# Patient Record
Sex: FEMALE | Race: WHITE | NOT HISPANIC OR LATINO
[De-identification: names, ages, dates, MRNs, and addresses within clinical notes are randomized per-mention and may not be internally consistent; named-entity substitution may affect disease eponyms.]

---

## 2017-07-01 ENCOUNTER — HEALTH MAINTENANCE LETTER (OUTPATIENT)
Age: 27
End: 2017-07-01

## 2018-05-18 ENCOUNTER — TRANSFERRED RECORDS (OUTPATIENT)
Dept: HEALTH INFORMATION MANAGEMENT | Facility: CLINIC | Age: 28
End: 2018-05-18

## 2018-05-22 DIAGNOSIS — Q82.3 INCONTINENTIA PIGMENTI: Primary | ICD-10-CM

## 2018-05-23 ENCOUNTER — OFFICE VISIT (OUTPATIENT)
Dept: OPHTHALMOLOGY | Facility: CLINIC | Age: 28
End: 2018-05-23
Attending: OPHTHALMOLOGY
Payer: COMMERCIAL

## 2018-05-23 DIAGNOSIS — H35.053 RETINAL NEOVASCULARIZATION OF BOTH EYES: ICD-10-CM

## 2018-05-23 DIAGNOSIS — Q82.3 INCONTINENTIA PIGMENTI: Primary | ICD-10-CM

## 2018-05-23 PROCEDURE — G0463 HOSPITAL OUTPT CLINIC VISIT: HCPCS | Mod: ZF

## 2018-05-23 PROCEDURE — 92250 FUNDUS PHOTOGRAPHY W/I&R: CPT | Mod: ZF | Performed by: OPHTHALMOLOGY

## 2018-05-23 PROCEDURE — 92134 CPTRZ OPH DX IMG PST SGM RTA: CPT | Mod: ZF | Performed by: OPHTHALMOLOGY

## 2018-05-23 PROCEDURE — 92235 FLUORESCEIN ANGRPH MLTIFRAME: CPT | Mod: ZF | Performed by: OPHTHALMOLOGY

## 2018-05-23 PROCEDURE — 67228 TREATMENT X10SV RETINOPATHY: CPT | Mod: ZF,LT | Performed by: OPHTHALMOLOGY

## 2018-05-23 RX ORDER — ATROPINE SULFATE 10 MG/ML
1 SOLUTION/ DROPS OPHTHALMIC DAILY
Qty: 1 BOTTLE | Refills: 0 | Status: SHIPPED | OUTPATIENT
Start: 2018-05-23 | End: 2021-07-12

## 2018-05-23 RX ORDER — PREDNISOLONE ACETATE 10 MG/ML
1 SUSPENSION/ DROPS OPHTHALMIC DAILY
Qty: 1 BOTTLE | Refills: 0 | Status: SHIPPED | OUTPATIENT
Start: 2018-05-23 | End: 2019-04-08

## 2018-05-23 ASSESSMENT — CONF VISUAL FIELD
OD_SUPERIOR_TEMPORAL_RESTRICTION: 1
OD_SUPERIOR_NASAL_RESTRICTION: 1
OS_SUPERIOR_TEMPORAL_RESTRICTION: 3
OS_INFERIOR_NASAL_RESTRICTION: 1
OS_INFERIOR_TEMPORAL_RESTRICTION: 1
OS_SUPERIOR_NASAL_RESTRICTION: 3
OD_INFERIOR_TEMPORAL_RESTRICTION: 1
OD_INFERIOR_NASAL_RESTRICTION: 1

## 2018-05-23 ASSESSMENT — VISUAL ACUITY
OS_PH_SC: 20/125
OD_SC: NLP
OS_SC: 20/500
METHOD: SNELLEN - LINEAR

## 2018-05-23 ASSESSMENT — EXTERNAL EXAM - RIGHT EYE: OD_EXAM: NORMAL

## 2018-05-23 ASSESSMENT — TONOMETRY
IOP_METHOD: TONOPEN
OS_IOP_MMHG: 15
OD_IOP_MMHG: 24

## 2018-05-23 ASSESSMENT — SLIT LAMP EXAM - LIDS
COMMENTS: NORMAL
COMMENTS: NORMAL

## 2018-05-23 ASSESSMENT — EXTERNAL EXAM - LEFT EYE: OS_EXAM: NORMAL

## 2018-05-23 NOTE — MR AVS SNAPSHOT
After Visit Summary   5/23/2018    Padmini Blandon    MRN: [id number]           Patient Information     Date Of Birth          1990        Visit Information        Provider Department      5/23/2018 12:00 PM Tiffanie Malloy MD [facility]        Today's Diagnoses     Blind painful eye    -  1    Incontinentia pigmenti           Follow-ups after your visit        Follow-up notes from your care team     Return in about 4 months (around 9/23/2018) for f/u incontinentia pigmenti ou.      Your next 10 appointments already scheduled     Sep 26, 2018  7:15 AM CDT   RETURN RETINA with Tiffanie Malloy MD   Eye Clinic ([facility])    [address]  9[address] Clin [address] [place] [id number] [phone]              Who to contact     Please call your clinic at [phone] to:    Ask questions about your health    Make or cancel appointments    Discuss your medicines    Learn about your test results    Speak to your doctor            Additional Information About Your Visit        [facility] Information     [facility] gives you secure access to your electronic health record. If you see a primary care provider, you can also send messages to your care team and make appointments. If you have questions, please call your primary care clinic.  If you do not have a primary care provider, please call [phone] and they will assist you.      [employer] is an electronic gateway that provides easy, online access to your medical records. With [employer], you can request a clinic appointment, read your test results, renew a prescription or communicate with your care team.     To access your existing account, please contact your [facility] or call [phone] for assistance.        Care EveryWhere ID     This is your Care EveryWhere ID. This could be used by other organizations to access your [place] medical records  [id number]          Blood Pressure from Last 3 Encounters:   No data found for BP    Weight from Last 3 Encounters:   No data found for Wt              We Performed the Following     Fluorescein Angiography OS (left eye)     Fundus Autofluorescence Image (FAF) OU (both eyes)     Fundus Photos OU (both eyes)     OCT Retina Spectralis OU (both eyes)     Panretinal Photocoagulation (PRP) OS (left eye)          Today's Medication Changes          These changes are accurate as of 5/23/18  4:39 PM.  If you have any questions, ask your nurse or doctor.               Start taking these medicines.        Dose/Directions    atropine 1 % ophthalmic solution   Used for:  Blind painful eye   Started by:  Tiffanie Malloy MD        Dose:  1 drop   Place 1 drop into the right eye daily   Quantity:  1 Bottle   Refills:  0       prednisoLONE acetate 1 % ophthalmic susp   Commonly known as:  PRED FORTE   Used for:  Blind painful eye   Started by:  Tiffanie Malloy MD        Dose:  1 drop   Place 1 drop into the right eye daily   Quantity:  1 Bottle   Refills:  0            Where to get your medicines      These medications were sent to [facility]/pharmacy #[id number] [place], [place] [address] [address] [place] [id number]     Phone:  [phone]     atropine 1 % ophthalmic solution    prednisoLONE acetate 1 % ophthalmic susp                Primary Care Provider Fax #    Physician No Ref-Primary [phone]       No address on file        Equal Access to Services     BARRY CHILDERS : Clifford hutchinso Soomaali, waaxda luqadaha, qaybta kaalmada adeegyada, susanne pardo. So [facility] [phone].    ATENCIÓN: Si habla español, tiene a sandoval disposición servicios gratuitos de asistencia lingüística. Llame al [phone].    We comply with applicable federal civil rights laws and [place] laws. We do not discriminate on the basis of race, color, national origin, age, disability, sex, sexual orientation, or  gender identity.            Thank you!     Thank you for choosing EYE CLINIC  for your care. Our goal is always to provide you with excellent care. Hearing back from our patients is one way we can continue to improve our services. Please take a few minutes to complete the written survey that you may receive in the mail after your visit with us. Thank you!             Your Updated Medication List - Protect others around you: Learn how to safely use, store and throw away your medicines at www.disposemymeds.org.          This list is accurate as of 5/23/18  4:39 PM.  Always use your most recent med list.                   Brand Name Dispense Instructions for use Diagnosis    atropine 1 % ophthalmic solution     1 Bottle    Place 1 drop into the right eye daily    Blind painful eye       benzoyl peroxide 5 % Lotn lotion      Apply topically At Bedtime        DOXYCYCLINE CALCIUM PO      Take 1 tablet by mouth daily        prednisoLONE acetate 1 % ophthalmic susp    PRED FORTE    1 Bottle    Place 1 drop into the right eye daily    Blind painful eye       SPIRONOLACTONE PO

## 2018-05-23 NOTE — LETTER
5/23/2018       RE: Padmini Blandon  [address]  [address]  [facility] [address]     Dear Dr. Medina,    Thank you for referring your patient, Padmini Blandon, to the EYE CLINIC at [facility]. Please see a copy of my visit note below.    CC: IP follow up     Padmini Blandon is a  [age] year old year-old with history of incontinenti pigmenti and Tractional retinal detachment right eye longstanding.  Status post Panretinal laser photocoagulation (PRP) filing both eyes here for follow up examination     Patient initially noted worsening of blurry vision OS 1 week ago (5/16/18). Initially felt it was related to allergies, sought care in ED, and was referred to Dr. Medina (retina specialist in [place], [place]) for further evaluation. Patient saw Dr. Medina (5/18/18) and she recommended retinal surgery with SO OS. She recommended that patient seek a second opinion with another retinal specialist prior to undergoing surgery.    Patient notes gradual worsening of VA OD since last eye exam here in 4/2018 (was HM at that time) and currently no light perception   Denies eye pain, occ flashes.    Ocular Imaging:  OCT macuala 5/23/18   Optical Coherence Tomography of poor quality because of  nystagmus shows irregular retina    elevation/gliosis over nerve, cystic changes in nasal macula, fovea attached,  Chronic inferior macula tractional detachment. Compared to previous OCT in 4/2016, appears STABLE.    Fundus Photos 05/23/18   left eye - peripheral laser scars, inferior temporal Tractional retinal detachment appears stable     FAF 05/23/18  demarcating peripheral areas of laser and inferior Tractional retinal detachment left eye     fluorescein angiography 05/23/18   left eye - extensive macular ischemia. Temporal macula laser scars.  leakage inferotemporally, Inferior nasally and mild leakage superotemporally near area of last Panretinal laser photocoagulation (PRP).      Assessment and  plan  Incontinentia pigmenti, both eyes  Patient did not returned for follow up since   right eye - currently with progression to no light perception   Status post Panretinal laser photocoagulation (PRP) 10/16/14 and      left eye - Status post Panretinal laser photocoagulation (PRP) filling left eye                   - Extensive macular ischemia on fluorescein angiography with continued area of leakage inferotemporally, Inferior nasally and mild leakage superotemporally near area of last Panretinal laser photocoagulation (PRP).                     Compared to prior fluorescein angiography there appears to be new leakage Inferior nasal and superior temporal near Panretinal laser photocoagulation (PRP) scars.    I discussed extensively treatment options: additional laser treatment to the new areas of leakage vs close observation vs surgery.  I explained risks, benefits and alternatives of retina surgery includin:1000 risk of infection/bleed/loss of eye; 1:100 risk of RD and need for further surgery. as well as possibility of air/gas/SO  instillation into eye. And cataract formation.    After discussed with patient risks, benefits and alternatives the decision was to performed laser treatment to the new areas of leakage.  Patient complaining of some headache during the procedure and decided to continue laser in 2- 3 months     Chronic tractional retinal detachment, right eye  - VA NLP OD today (verified by MD)  - IOP 24 OD, rubeosis present  -patient reports chronic occasional HA  - discussed risk of neovascular glaucoma and blind painful eye  - start atropine daily and pred forte daily OD  - discussed the possibility of avastin inj right eye to control neovascularization of the iris.patient will consider in the future     - monocular precautions  The patient was told to wear polycarbonte glasses at all times to protect the good eye.  she expressed understanding.      will follow up in 2-3 month with  Optical Coherence Tomography and for additional laser treatment     Retina detachment precautions were discussed with the patient (presence or increased in flashes, floaters or a curtain in the visual field) and was asked to return if any of the those occur    Anitha Page MD   Ophthalmology PGY-3    ~~~~~~~~~~~~~~~~~~~~~~~~~~~~~~~~~~  Complete documentation of historical and exam elements from today's encounter can be found in the full encounter summary report (not reduplicated in this progress note).  I personally obtained the chief complaint(s) and history of present illness.  I confirmed and edited as necessary the review of systems, past medical/surgical history, family history, social history, and examination findings as documented by others; and I examined the patient myself.  I personally reviewed the relevant tests, images, and reports as documented above.  I formulated and edited as necessary the assessment and plan and discussed the findings and management plan with the patient and family and I was present for critical portions of the procedure carried out by the resident/fellow and immediately available for the entire procedure. Tiffanie Malloy MD    Again, thank you for allowing me to participate in the care of your patient.      Sincerely,    Tiffanie Malloy MD     Retina Service   Department of Ophthalmology and Visual Neurosciences   [facility]  Phone:  [phone]   Fax:  [phone]

## 2018-05-23 NOTE — PROGRESS NOTES
CC: IP follow up     Padmini Blandon is a  27 year old year-old with history of incontinenti pigmenti and Tractional retinal detachment right eye longstanding.  Status post Panretinal laser photocoagulation (PRP) filing both eyes here for follow up examination     Patient initially noted worsening of blurry vision OS 1 week ago (5/16/18). Initially felt it was related to allergies, sought care in ED, and was referred to Dr. Medina (retina specialist in [place], [place]) for further evaluation. Patient saw Dr. Medina (5/18/18) and she recommended retinal surgery with SO OS. She recommended that patient seek a second opinion with another retinal specialist prior to undergoing surgery.    Patient notes gradual worsening of VA OD since last eye exam here in 4/2018 (was HM at that time) and currently no light perception   Denies eye pain, occ flashes.    Ocular Imaging:  OCT macuala 5/23/18   Optical Coherence Tomography of poor quality because of  nystagmus shows irregular retina    elevation/gliosis over nerve, cystic changes in nasal macula, fovea attached,  Chronic inferior macula tractional detachment. Compared to previous OCT in 4/2016, appears STABLE.    Fundus Photos 05/23/18   left eye - peripheral laser scars, inferior temporal Tractional retinal detachment appears stable     FAF 05/23/18  demarcating peripheral areas of laser and inferior Tractional retinal detachment left eye     fluorescein angiography 05/23/18   left eye - extensive macular ischemia. Temporal macula laser scars.  leakage inferotemporally, Inferior nasally and mild leakage superotemporally near area of last Panretinal laser photocoagulation (PRP).      Assessment and plan  Incontinentia pigmenti, both eyes  Patient did not returned for follow up since 2015  right eye - currently with progression to no light perception   Status post Panretinal laser photocoagulation (PRP) 10/16/14 and 2015     left eye - Status post Panretinal laser  photocoagulation (PRP) filling left eye                   - Extensive macular ischemia on fluorescein angiography with continued area of leakage inferotemporally, Inferior nasally and mild leakage superotemporally near area of last Panretinal laser photocoagulation (PRP).                     Compared to prior fluorescein angiography there appears to be new leakage Inferior nasal and superior temporal near Panretinal laser photocoagulation (PRP) scars.    I discussed extensively treatment options: additional laser treatment to the new areas of leakage vs close observation vs surgery.  I explained risks, benefits and alternatives of retina surgery includin:1000 risk of infection/bleed/loss of eye; 1:100 risk of RD and need for further surgery. as well as possibility of air/gas/SO  instillation into eye. And cataract formation.    After discussed with patient risks, benefits and alternatives the decision was to performed laser treatment to the new areas of leakage.  Patient complaining of some headache during the procedure and decided to continue laser in 2- 3 months     Chronic tractional retinal detachment, right eye  - VA NLP OD today (verified by MD)  - IOP 24 OD, rubeosis present  -patient reports chronic occasional HA  - discussed risk of neovascular glaucoma and blind painful eye  - start atropine daily and pred forte daily OD  - discussed the possibility of avastin inj right eye to control neovascularization of the iris.patient will consider in the future     - monocular precautions  The patient was told to wear polycarbonte glasses at all times to protect the good eye.  she expressed understanding.      will follow up in 2-3 month with Optical Coherence Tomography and for additional laser treatment     Retina detachment precautions were discussed with the patient (presence or increased in flashes, floaters or a curtain in the visual field) and was asked to return if any of the those occur      Anitha  MD Camilo   Ophthalmology PGY-3    ~~~~~~~~~~~~~~~~~~~~~~~~~~~~~~~~~~   Complete documentation of historical and exam elements from today's encounter can be found in the full encounter summary report (not reduplicated in this progress note).  I personally obtained the chief complaint(s) and history of present illness.  I confirmed and edited as necessary the review of systems, past medical/surgical history, family history, social history, and examination findings as documented by others; and I examined the patient myself.  I personally reviewed the relevant tests, images, and reports as documented above.  I formulated and edited as necessary the assessment and plan and discussed the findings and management plan with the patient and family and I was present for critical portions of the procedure carried out by the resident/fellow and immediately available for the entire procedure    Tiffanie Malloy MD  .  Retina Service   Department of Ophthalmology and Visual Neurosciences   [facility]  Phone: [phone]   Fax: [phone]

## 2018-09-12 DIAGNOSIS — Q82.3 INCONTINENTIA PIGMENTI: Primary | ICD-10-CM

## 2018-09-26 ENCOUNTER — OFFICE VISIT (OUTPATIENT)
Dept: OPHTHALMOLOGY | Facility: CLINIC | Age: 28
End: 2018-09-26
Attending: OPHTHALMOLOGY

## 2018-09-26 DIAGNOSIS — Q82.3 INCONTINENTIA PIGMENTI: Primary | ICD-10-CM

## 2018-09-26 PROCEDURE — 92250 FUNDUS PHOTOGRAPHY W/I&R: CPT | Mod: ZF | Performed by: OPHTHALMOLOGY

## 2018-09-26 PROCEDURE — 92015 DETERMINE REFRACTIVE STATE: CPT | Mod: ZF

## 2018-09-26 PROCEDURE — 92134 CPTRZ OPH DX IMG PST SGM RTA: CPT | Mod: ZF | Performed by: OPHTHALMOLOGY

## 2018-09-26 PROCEDURE — G0463 HOSPITAL OUTPT CLINIC VISIT: HCPCS | Mod: ZF

## 2018-09-26 ASSESSMENT — CONF VISUAL FIELD
OD_INFERIOR_TEMPORAL_RESTRICTION: 1
OD_SUPERIOR_NASAL_RESTRICTION: 1
OS_INFERIOR_TEMPORAL_RESTRICTION: 1
OS_SUPERIOR_TEMPORAL_RESTRICTION: 3
OS_INFERIOR_NASAL_RESTRICTION: 1
OS_SUPERIOR_NASAL_RESTRICTION: 3
OD_INFERIOR_NASAL_RESTRICTION: 1
OD_SUPERIOR_TEMPORAL_RESTRICTION: 1

## 2018-09-26 ASSESSMENT — TONOMETRY
OD_IOP_MMHG: 14
OD_IOP_MMHG: 17
IOP_METHOD: TONOPEN
IOP_METHOD: ICARE
OS_IOP_MMHG: 17
OS_IOP_MMHG: 17

## 2018-09-26 ASSESSMENT — SLIT LAMP EXAM - LIDS
COMMENTS: NORMAL
COMMENTS: NORMAL

## 2018-09-26 ASSESSMENT — VISUAL ACUITY
OD_SC: NLP
OS_SC: 20/300
OS_PH_SC: 20/150
METHOD: SNELLEN - LINEAR

## 2018-09-26 ASSESSMENT — EXTERNAL EXAM - LEFT EYE: OS_EXAM: NORMAL

## 2018-09-26 ASSESSMENT — REFRACTION_MANIFEST
OD_SPHERE: BALANCE
OS_SPHERE: -4.00

## 2018-09-26 ASSESSMENT — EXTERNAL EXAM - RIGHT EYE: OD_EXAM: NORMAL

## 2018-09-26 NOTE — PROGRESS NOTES
CC: IP follow up     Padmini Blandon is a  27 year old year-old with history of incontinenti pigmenti and Tractional retinal detachment right eye longstanding.  Status post Panretinal laser photocoagulation (PRP) filing both eyes here for follow up examination     Patient initially noted worsening of blurry vision OS (5/16/18). Initially felt it was related to allergies, sought care in ED, and was referred to Dr. Medina (retina specialist in [place], [place]) for further evaluation. Patient saw Dr. Medina (5/18/18) and she recommended retinal surgery with SO OS. She recommended that patient seek a second opinion with another retinal specialist prior to undergoing surgery.    Patient noted gradual worsening of VA OD in 4/2018 (was HM at that time) and was no light perception in 5/2018. Reports VA has been stable since last exam. Occasional flashes. No eye pain.    Ocular Imaging:  OCT macuala 09/26/18   Optical Coherence Tomography of poor quality because of  nystagmus shows irregular retina elevation/gliosis over nerve, cystic changes in nasal macula, fovea attached,  Chronic inferior macula tractional detachment. Compared to previous OCT in 5/2018 appears STABLE.    Fundus Photos 09/26/18   Left eye - peripheral laser scars, inferior temporal Tractional retinal detachment appears stable     FAF 05/23/18    Demarcating peripheral areas of laser and inferior Tractional retinal detachment left eye     FA 05/23/18   left eye - extensive macular ischemia. Temporal macula laser scars.  leakage inferotemporally, Inferior nasally and mild leakage superotemporally near area of last Panretinal laser photocoagulation (PRP).      Assessment and plan    Incontinentia pigmenti, both eyes  Chronic tractional retinal detachment, left eye      Status post Panretinal laser photocoagulation (PRP) filling left eye 2014    Extensive macular ischemia on prior fluorescein angiography with continued area of leakage inferotemporally, Inferior  nasally  mild leakage superotemporally near area of last Panretinal laser photocoagulation (PRP).        Compared to prior FA there appears to be new leakage Inferior nasal and superior temporal near PRP scars     Add more laser today since we weer not able to complete last visit    I discussed extensively treatment options: additional laser treatment to the new areas of leakage vs close observation vs surgery.  I explained risks, benefits and alternatives of retina surgery includin:1000 risk of infection/bleed/loss of eye; 1:100 risk of RD and need for further surgery. as well as possibility of air/gas/SO  instillation into eye. And cataract formation.    I recommend to follow up with Randy in the next month for second opinion regarding surgical recommendations for left eye     Chronic tractional retinal detachment, right eye  OD - currently with progression to no light perception    Status post Panretinal laser photocoagulation (PRP) 10/16/14 and    - VA NLP OD today (verified by MD)   - IOP 17 OD, rubeosis present   - patient reports chronic occasional HA   - discussed risk of neovascular glaucoma and blind painful eye   - complaining of burning sensation with the use of atropine daily   -Ok to use only pred forte daily OD   - discussed the possibility of avastin inj right eye to control neovascularization of the iris. patient will consider in the future     Monocular patient    - monocular precautions   - The patient was reminded to wear polycarbonte glasses at all times to protect the good eye. She expressed understanding.      -------------------------------------------------------------------------------------------------------------------------------  Procedure Note  Pre-operative diagnosis:  Retinal detachment left eye  Post-operative diagnosis:   same.  Procedure performed:   Laser retinopexy left eye   Anesthesia:     Topical proparacaine 0.5% drops and lidocaine gel  Complications:    None.    Description of the procedure:    Informed consent was obtained from the patient.  The patient was brought to the laser room where argon laser was applied to the retina. Parameters:  -Lens: area centralis  -Spot size: 100 microns  -Power:  120 mW  -Duration: 100 ms  -Total spots:  30      The patient was given options of treatment or observation and she elected to treat. All risks/benefits/alternatives were reviewed and he elected to proceed. After informed consent was obtained, the patient was seated at the slit lamp laser. The eye was number with proparacaine 1%. A contact lens was used. The treatment was given without complications avoiding the optic nerve head, large vessels, and at least 500 microns from the macular arcades.   Return precautions were given.     The patient tolerated the procedure well.  There were no complications.   The patient  left the clinic in stable condition.    Retinal detachment  Precautions were discussed with the patient and was asked to return if any of those occur.  Patient without  Insurance; laser done at not charge    Ayden Price MD, PhD  Vitreoretinal Surgery Fellow    Fredy Lisa MD  PGY-3 Ophthalmology    ~~~~~~~~~~~~~~~~~~~~~~~~~~~~~~~~~~   Complete documentation of historical and exam elements from today's encounter can be found in the full encounter summary report (not reduplicated in this progress note).  I personally obtained the chief complaint(s) and history of present illness.  I confirmed and edited as necessary the review of systems, past medical/surgical history, family history, social history, and examination findings as documented by others; and I examined the patient myself.  I personally reviewed the relevant tests, images, and reports as documented above.  I formulated and edited as necessary the assessment and plan and discussed the findings and management plan with the patient and family and I was present for critical portions of the  procedure carried out by the resident/fellow and immediately available for the entire procedure    Tiffanie Malloy MD  .  Retina Service   Department of Ophthalmology and Visual Neurosciences   [facility]  Phone: [phone]   Fax: [phone]

## 2018-09-26 NOTE — MR AVS SNAPSHOT
After Visit Summary   9/26/2018    Padmini Blandon    MRN: [id number]           Patient Information     Date Of Birth          1990        Visit Information        Provider Department      9/26/2018 7:15 AM Tiffanie Malloy MD [facility]        Today's Diagnoses     Incontinentia pigmenti    -  1       Follow-ups after your visit        Follow-up notes from your care team     Return in about 3 months (around 12/26/2018) for OCT and FA (transit OS), DFE.      Your next 10 appointments already scheduled     Oct 29, 2018  8:00 AM CDT   NEW RETINA with Shell Padilla MD   Eye Clinic ([facility])    [address]  9[facility] [address] [place] [id number] [phone]              Future tests that were ordered for you today     Open Future Orders        Priority Expected Expires Ordered    OCT Retina Spectralis OU (both eyes) Routine  9/26/2019 9/26/2018            Who to contact     Please call your clinic at [phone] to:    Ask questions about your health    Make or cancel appointments    Discuss your medicines    Learn about your test results    Speak to your doctor            Additional Information About Your Visit        AntengoharFloat: [place] Information     Yoyo gives you secure access to your electronic health record. If you see a primary care provider, you can also send messages to your care team and make appointments. If you have questions, please call your primary care clinic.  If you do not have a primary care provider, please call [phone] and they will assist you.      Yoyo is an electronic gateway that provides easy, online access to your medical records. With Yoyo, you can request a clinic appointment, read your test results, renew a prescription or communicate with your care team.     To access your existing account, please contact your [facility] or call [phone] for assistance.         Care EveryWhere ID     This is your Care EveryWhere ID. This could be used by other organizations to access your [facility] medical records  [id number]         Blood Pressure from Last 3 Encounters:   No data found for BP    Weight from Last 3 Encounters:   No data found for Wt              We Performed the Following     OCT Retina Spectralis OU (both eyes)        Primary Care Provider Fax #    Physician No Ref-Primary [phone]       No address on file        Equal Access to Services     BARRY CHILDERS : Hadii aad ku hadasho Soomaali, waaxda luqadaha, qaybta kaalmada adeegyada, waxay idiin hayarthurn adeeg alma laagapiton . So [facility] [phone].    ATENCIÓN: Si koby marquez, tiene a sandoval disposición servicios gratuitos de asistencia lingüística. Llame al [phone].    We comply with applicable federal civil rights laws and [place] laws. We do not discriminate on the basis of race, color, national origin, age, disability, sex, sexual orientation, or gender identity.            Thank you!     Thank you for choosing EYE CLINIC  for your care. Our goal is always to provide you with excellent care. Hearing back from our patients is one way we can continue to improve our services. Please take a few minutes to complete the written survey that you may receive in the mail after your visit with us. Thank you!             Your Updated Medication List - Protect others around you: Learn how to safely use, store and throw away your medicines at www.disposemymeds.org.          This list is accurate as of 9/26/18 10:23 AM.  Always use your most recent med list.                   Brand Name Dispense Instructions for use Diagnosis    atropine 1 % ophthalmic solution     1 Bottle    Place 1 drop into the right eye daily        benzoyl peroxide 5 % Lotn lotion      Apply topically At Bedtime        DOXYCYCLINE CALCIUM PO      Take 1 tablet by mouth daily        prednisoLONE acetate 1 % ophthalmic susp    PRED FORTE    1 Bottle     Place 1 drop into the right eye daily        SPIRONOLACTONE PO

## 2018-09-26 NOTE — NURSING NOTE
Chief Complaints and History of Present Illnesses   Patient presents with     Follow Up For     Incontinentia pigmenti, both eyes     HPI    Affected eye(s):  Left   Symptoms:     Blurred vision   Photophobia      Duration:  4 months      Do you have eye pain now?:  No      Comments:  Follow up for Incontinentia pigmenti, both eyes.    The patient notes that the drops used in the right eye seem to effect her left eye vision and light sensitivity.    MIKE Collier 7:31 AM 09/26/2018

## 2018-11-26 ENCOUNTER — OFFICE VISIT (OUTPATIENT)
Dept: OPHTHALMOLOGY | Facility: CLINIC | Age: 28
End: 2018-11-26

## 2018-11-26 DIAGNOSIS — H35.053 RETINAL NEOVASCULARIZATION OF BOTH EYES: ICD-10-CM

## 2018-11-26 DIAGNOSIS — Q82.3 INCONTINENTIA PIGMENTI: Primary | ICD-10-CM

## 2018-11-26 PROCEDURE — G0463 HOSPITAL OUTPT CLINIC VISIT: HCPCS | Mod: ZF

## 2018-11-26 ASSESSMENT — TONOMETRY
OS_IOP_MMHG: 21
IOP_METHOD: ICARE
OD_IOP_MMHG: 22

## 2018-11-26 ASSESSMENT — SLIT LAMP EXAM - LIDS
COMMENTS: NORMAL
COMMENTS: NORMAL

## 2018-11-26 ASSESSMENT — VISUAL ACUITY
CORRECTION_TYPE: GLASSES
OS_CC: 20/150
OD_CC: NLP
METHOD: SNELLEN - LINEAR

## 2018-11-26 ASSESSMENT — EXTERNAL EXAM - LEFT EYE: OS_EXAM: NORMAL

## 2018-11-26 ASSESSMENT — CUP TO DISC RATIO: OS_RATIO: SMALL

## 2018-11-26 ASSESSMENT — EXTERNAL EXAM - RIGHT EYE: OD_EXAM: NORMAL

## 2018-11-26 NOTE — NURSING NOTE
Chief Complaints and History of Present Illnesses   Patient presents with     Post Op (Ophthalmology) Left Eye     s/p laser LE     HPI    Affected eye(s):  Left   Symptoms:        Duration:  2 months   Frequency:  Constant       Do you have eye pain now?:  No      Comments:  Pt. States that there has been no change in VA BE.  No pain BE.  No flashes or floaters BE.  Dionne Quiles COT 8:08 AM November 26, 2018

## 2018-11-26 NOTE — MR AVS SNAPSHOT
After Visit Summary   11/26/2018    Padmini Blandon    MRN: [id number]           Patient Information     Date Of Birth          1990        Visit Information        Provider Department      11/26/2018 8:00 AM Shell Padilla MD [facility]        Today's Diagnoses     Incontinentia pigmenti    -  1    Retinal neovascularization of both eyes           Follow-ups after your visit        Follow-up notes from your care team     Return in about 3 months (around 2/26/2019) for OCT OU.      Your next 10 appointments already scheduled     Feb 11, 2019  7:30 AM CST   RETURN RETINA with Shell Padilla MD   Eye Clinic ([facility])    [address]  9[facility] [address] [place] [id number] [phone]              Who to contact     Please call your clinic at [phone] to:    Ask questions about your health    Make or cancel appointments    Discuss your medicines    Learn about your test results    Speak to your doctor            Additional Information About Your Visit        [email or web address] Information     [email or web address] gives you secure access to your electronic health record. If you see a primary care provider, you can also send messages to your care team and make appointments. If you have questions, please call your primary care clinic.  If you do not have a primary care provider, please call [phone] and they will assist you.      [email or web address] is an electronic gateway that provides easy, online access to your medical records. With [email or web address], you can request a clinic appointment, read your test results, renew a prescription or communicate with your care team.     To access your existing account, please contact your [facility] or call [phone] for assistance.        Care EveryWhere ID     This is your Care EveryWhere ID. This could be used by other organizations to access your [facility] medical records  [id number]          Blood Pressure from Last 3 Encounters:   No data found for BP    Weight from Last 3 Encounters:   No data found for Wt              Today, you had the following     No orders found for display       Primary Care Provider Fax #    Physician No Ref-Primary [phone]       No address on file        Equal Access to Services     BARRY CHILDERS : Clifford quintana eloisa kevny Sofrancisco, wamedda luqadaha, qaybta kaalmada ademanuel, susanne marrhanh . So [facility] [phone].    ATENCIÓN: Si habla español, tiene a sandoval disposición servicios gratuitos de asistencia lingüística. Llame al [phone].    We comply with applicable federal civil rights laws and [place] laws. We do not discriminate on the basis of race, color, national origin, age, disability, sex, sexual orientation, or gender identity.            Thank you!     Thank you for choosing EYE CLINIC  for your care. Our goal is always to provide you with excellent care. Hearing back from our patients is one way we can continue to improve our services. Please take a few minutes to complete the written survey that you may receive in the mail after your visit with us. Thank you!             Your Updated Medication List - Protect others around you: Learn how to safely use, store and throw away your medicines at www.disposemymeds.org.          This list is accurate as of 11/26/18 10:26 AM.  Always use your most recent med list.                   Brand Name Dispense Instructions for use Diagnosis    atropine 1 % ophthalmic solution     1 Bottle    Place 1 drop into the right eye daily        benzoyl peroxide 5 % Lotn lotion      Apply topically At Bedtime        DOXYCYCLINE CALCIUM PO      Take 1 tablet by mouth daily        prednisoLONE acetate 1 % ophthalmic susp    PRED FORTE    1 Bottle    Place 1 drop into the right eye daily        SPIRONOLACTONE PO

## 2018-11-26 NOTE — PROGRESS NOTES
CC -  Incontinentia Pigmenti with TRD    INTERVAL HISTORY - Initial visit with me.  No changes since saw  9/2018.  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 Incontinentia Pigmenti   - severe, NLP OD   - s/p PRP OU remote, and OS 8/2104 & 9/2018   - no active NV visible on exam   - patient nystagmus makes imaging very difficult    2. TRD OS   - s/p PRP remote, and 8/2014 & 9/2018   - extensive schisis and areas of SRF on OCT   - difficult to get images through macula d/t nystagmus   - unclear if worsening   - no subjective change to vision      - given monocular and high risk for PVR observe closely for worsening       3. TRD OD with NLP vision       4. Nystagmus    return to clinic: 3 months with     ATTESTATION     Attending Attestation:     Complete documentation of historical and exam elements from today's encounter can be found in the full encounter summary report (not reduplicated in this progress note).  I personally obtained the chief complaint(s) and history of present illness.  I confirmed and edited as necessary the review of systems, past medical/surgical history, family history, social history, and examination findings as documented by others; and I examined the patient myself.  I personally reviewed the relevant tests, images, and reports as documented above.  I formulated and edited as necessary the assessment and plan and discussed the findings and management plan with the patient and family    Shell Padilla MD, PhD  , Vitreoretinal Surgery  Department of Ophthalmology  [facility]

## 2018-11-30 ENCOUNTER — TELEPHONE (OUTPATIENT)
Dept: OPHTHALMOLOGY | Facility: CLINIC | Age: 28
End: 2018-11-30

## 2018-11-30 NOTE — TELEPHONE ENCOUNTER
[facility] Call Center    Phone Message    May a detailed message be left on voicemail: yes    Reason for Call: Other: per pt- is wondering if she can take prednisoLONE acetate (PRED FORTE) 1 % ophthalmic susp in her left eye as well, as she is starting to feel some pressure- please call pt thanks     Action Taken: Message routed to:  [facility] eye clinic

## 2018-12-04 NOTE — TELEPHONE ENCOUNTER
Called patient and advised not to start using Pred Forte in the left eye. She is monocular and I explained why we are asking her to use Pred Forte in only the right (NLP) eye; patient understood. I advised her to use artificial tears if she has some discomfort and then if not resolved to find a nearby eye provider to check her IOP. She is having no visual changes, flashes, or floaters. Patient in agreement with this plan.    Chinmay Young M.D.  PGY-3, Ophthalmology

## 2019-04-08 DIAGNOSIS — Q82.3 INCONTINENTIA PIGMENTI: Primary | ICD-10-CM

## 2019-04-08 RX ORDER — PREDNISOLONE ACETATE 10 MG/ML
1 SUSPENSION/ DROPS OPHTHALMIC DAILY
Qty: 1 BOTTLE | Refills: 0 | Status: SHIPPED | OUTPATIENT
Start: 2019-04-08 | End: 2020-01-02

## 2019-04-08 NOTE — TELEPHONE ENCOUNTER
prednisoLONE acetate (PRED FORTE) 1 % ophthalmic susp    Last Written Prescription Date:  5/23/18  Last Fill Quantity: 1 bottle,   # refills: 0  Last Office Visit : 11/26/18  Future Office visit:  None     9/26/18 Tiffanie Malloy MD     Ok to use only pred forte daily OD      Routing refill request to provider for review/approval because:  Provider review required.  # qty ml?

## 2019-04-08 NOTE — TELEPHONE ENCOUNTER
[facility] Call Center    Phone Message    May a detailed message be left on voicemail: yes    Reason for Call: Medication Refill Request    Has the patient contacted the pharmacy for the refill? Yes   Name of medication being requested: prednisoLONE acetate (PRED FORTE) 1 % ophthalmic susp  Provider who prescribed the medication:   Pharmacy: [facility] [address] [place] [address] [place]  Date medication is needed: ASAP         Action Taken: Message routed to:  [facility] EYE

## 2019-09-03 ENCOUNTER — OFFICE VISIT (OUTPATIENT)
Dept: OPHTHALMOLOGY | Facility: CLINIC | Age: 29
End: 2019-09-03
Attending: OPHTHALMOLOGY

## 2019-09-03 DIAGNOSIS — H33.42 TRACTION DETACHMENT OF LEFT RETINA: ICD-10-CM

## 2019-09-03 DIAGNOSIS — Q82.3 INCONTINENTIA PIGMENTI: Primary | ICD-10-CM

## 2019-09-03 DIAGNOSIS — H35.053 RETINAL NEOVASCULARIZATION OF BOTH EYES: ICD-10-CM

## 2019-09-03 PROCEDURE — G0463 HOSPITAL OUTPT CLINIC VISIT: HCPCS | Mod: ZF,25

## 2019-09-03 PROCEDURE — 40000141 ZZH STATISTIC PERIPHERAL IV START W/O US GUIDANCE

## 2019-09-03 PROCEDURE — 92134 CPTRZ OPH DX IMG PST SGM RTA: CPT | Mod: ZF | Performed by: OPHTHALMOLOGY

## 2019-09-03 PROCEDURE — 92235 FLUORESCEIN ANGRPH MLTIFRAME: CPT | Mod: ZF | Performed by: OPHTHALMOLOGY

## 2019-09-03 ASSESSMENT — CONF VISUAL FIELD
OD_SUPERIOR_NASAL_RESTRICTION: 1
OD_INFERIOR_NASAL_RESTRICTION: 1
OD_SUPERIOR_TEMPORAL_RESTRICTION: 1
OS_INFERIOR_NASAL_RESTRICTION: 1
OS_SUPERIOR_NASAL_RESTRICTION: 3
OS_INFERIOR_TEMPORAL_RESTRICTION: 1
OS_SUPERIOR_TEMPORAL_RESTRICTION: 3
OD_INFERIOR_TEMPORAL_RESTRICTION: 1

## 2019-09-03 ASSESSMENT — REFRACTION_WEARINGRX
OD_SPHERE: BALANCE
OS_SPHERE: -4.00

## 2019-09-03 ASSESSMENT — SLIT LAMP EXAM - LIDS
COMMENTS: NORMAL
COMMENTS: NORMAL

## 2019-09-03 ASSESSMENT — VISUAL ACUITY
OS_CC: 20/150
CORRECTION_TYPE: GLASSES
OD_CC: NLP
METHOD: SNELLEN - LINEAR

## 2019-09-03 ASSESSMENT — EXTERNAL EXAM - LEFT EYE: OS_EXAM: NORMAL

## 2019-09-03 ASSESSMENT — TONOMETRY
OS_IOP_MMHG: 19
IOP_METHOD: ICARE
OD_IOP_MMHG: 13

## 2019-09-03 ASSESSMENT — CUP TO DISC RATIO: OS_RATIO: SMALL

## 2019-09-03 ASSESSMENT — EXTERNAL EXAM - RIGHT EYE: OD_EXAM: NORMAL

## 2019-09-03 NOTE — PROGRESS NOTES
CC -  Incontinentia Pigmenti with TRD    INTERVAL HISTORY - Reports vision stable since LARON in 2018.  Missed appts d/t illness and snowstorms  No insurance, paying out of pocket      HPI -   Padmini Blandon is a  28 year old year-old patient with IP and TRD OU, monocular.      PAST OCULAR SURGERY  PRP OU (remote)  PRP OS 8/2014 (DDK)  PRP OS 9/26/18 (SM)    RETINAL IMAGING:  OCT 9/3/19  OD - no view  OS - mild traction/schisis in macula, severe schisis/CME outside of macula, similar to 2018 (hard to compare d/t single cuts)    FA 9/3/19  OD -poor image quality  OS - (transit) macular ischemia, leakage at edge of prior PRP but no change in ischemia            ASSESSMENT & PLAN    1. Incontinentia Pigmenti   - severe, NLP OD   - s/p PRP OU remote, and OS 8/2104 & 9/2018   - no active NV visible on exam or FA 9/2019     - patient nystagmus makes imaging very difficult    2. TRD OS   - s/p PRP remote, and 8/2014 & 9/2018   - extensive schisis and areas of SRF on OCT outside of macula   - difficult to get images through macula d/t nystagmus   - unclear if worsening, no clear change from 2018   - no subjective change to vision      - given monocular and high risk for PVR observe closely for worsening   - recheck 3-4 months if able to local   - recheck 4-6 months here       3. TRD OD with NLP vision       4. Nystagmus    return to clinic: 4 months with SM, OCT and Optos Photos OU    Christopher Forte MD - PGY 3  Ophthalmology resident    ATTESTATION     Attending Attestation:     Complete documentation of historical and exam elements from today's encounter can be found in the full encounter summary report (not reduplicated in this progress note).  I personally obtained the chief complaint(s) and history of present illness.  I confirmed and edited as necessary the review of systems, past medical/surgical history, family history, social history, and examination findings as documented by others; and I examined the patient  myself.  I personally reviewed the relevant tests, images, and reports as documented above.  I personally reviewed the ophthalmic test(s) associated with this encounter, agree with the interpretation(s) as documented by the resident/fellow, and have edited the corresponding report(s) as necessary.   I formulated and edited as necessary the assessment and plan and discussed the findings and management plan with the patient and family    Shell Padilla MD, PhD  , Vitreoretinal Surgery  Department of Ophthalmology  [facility]

## 2019-09-03 NOTE — NURSING NOTE
Chief Complaints and History of Present Illnesses   Patient presents with     Follow Up     Chief Complaint(s) and History of Present Illness(es)     Follow Up     Laterality: both eyes    Onset: 6 months ago              Comments      Incontinentia Pigmenti  Pt. States that she is doing well.  No change in VA BE.  No pain BE.  Dionne HOPKINS 8:23 AM September 3, 2019

## 2019-10-04 ENCOUNTER — HEALTH MAINTENANCE LETTER (OUTPATIENT)
Age: 29
End: 2019-10-04

## 2019-12-26 DIAGNOSIS — Q82.3 INCONTINENTIA PIGMENTI: ICD-10-CM

## 2019-12-26 NOTE — TELEPHONE ENCOUNTER
[facility] Call Center    Phone Message    May a detailed message be left on voicemail: yes    Reason for Call: Medication Refill Request    Has the patient contacted the pharmacy for the refill? Yes   Name of medication being requested: prednisoLONE acetate (PRED FORTE) 1 % ophthalmic   Provider who prescribed the medication: Dr Tiffanie Manuel  Pharmacy: [facility]Y #[id number] - [place], [place] - [address] [place] [address]  Date medication is needed: ASAP     LM for Patient to use in the NLP eye and would reevaluated on F/U  KELLIE Sarmiento COT 3:52 PM December 30, 2019       Action Taken: Message routed to:  [facility] eye

## 2020-01-02 RX ORDER — PREDNISOLONE ACETATE 10 MG/ML
1 SUSPENSION/ DROPS OPHTHALMIC DAILY
Qty: 1 BOTTLE | Refills: 0 | Status: SHIPPED | OUTPATIENT
Start: 2020-01-02

## 2020-01-02 NOTE — TELEPHONE ENCOUNTER
[facility] Call Center    Phone Message    May a detailed message be left on voicemail: yes    Reason for Call: Medication Refill Request    Has the patient contacted the pharmacy for the refill? Yes   Name of medication being requested: prednisoLONE acetate (PRED FORTE) 1 % ophthalmic suspension  Provider who prescribed the medication: Dr. Tiffanie Malloy  Pharmacy: [facility]Y #[id number] - [place], [place] - [address] [place] [address]  Date medication is needed: asap pt is out         Action Taken: Message routed to:  [facility] med refill

## 2020-04-03 ENCOUNTER — TRANSFERRED RECORDS (OUTPATIENT)
Dept: HEALTH INFORMATION MANAGEMENT | Facility: CLINIC | Age: 30
End: 2020-04-03

## 2020-07-13 ENCOUNTER — OFFICE VISIT (OUTPATIENT)
Dept: OPHTHALMOLOGY | Facility: CLINIC | Age: 30
End: 2020-07-13
Attending: OPHTHALMOLOGY
Payer: COMMERCIAL

## 2020-07-13 DIAGNOSIS — Q82.3 INCONTINENTIA PIGMENTI: Primary | ICD-10-CM

## 2020-07-13 DIAGNOSIS — Q82.3 INCONTINENTIA PIGMENTI: ICD-10-CM

## 2020-07-13 PROCEDURE — G0463 HOSPITAL OUTPT CLINIC VISIT: HCPCS | Mod: ZF

## 2020-07-13 PROCEDURE — 92134 CPTRZ OPH DX IMG PST SGM RTA: CPT | Mod: ZF | Performed by: OPHTHALMOLOGY

## 2020-07-13 PROCEDURE — 92250 FUNDUS PHOTOGRAPHY W/I&R: CPT | Mod: ZF | Performed by: OPHTHALMOLOGY

## 2020-07-13 ASSESSMENT — EXTERNAL EXAM - RIGHT EYE: OD_EXAM: NORMAL

## 2020-07-13 ASSESSMENT — CONF VISUAL FIELD
OD_INFERIOR_TEMPORAL_RESTRICTION: 1
OD_INFERIOR_NASAL_RESTRICTION: 1
OD_SUPERIOR_TEMPORAL_RESTRICTION: 1
OS_SUPERIOR_TEMPORAL_RESTRICTION: 3
OS_INFERIOR_TEMPORAL_RESTRICTION: 1
OD_SUPERIOR_NASAL_RESTRICTION: 1
OS_INFERIOR_NASAL_RESTRICTION: 1
OS_SUPERIOR_NASAL_RESTRICTION: 3

## 2020-07-13 ASSESSMENT — TONOMETRY
OS_IOP_MMHG: 19
OD_IOP_MMHG: 09
IOP_METHOD: TONOPEN

## 2020-07-13 ASSESSMENT — SLIT LAMP EXAM - LIDS
COMMENTS: NORMAL
COMMENTS: NORMAL

## 2020-07-13 ASSESSMENT — VISUAL ACUITY
METHOD: SNELLEN - LINEAR
OD_SC: NLP
OS_SC: 20/300

## 2020-07-13 ASSESSMENT — CUP TO DISC RATIO: OS_RATIO: SMALL

## 2020-07-13 ASSESSMENT — EXTERNAL EXAM - LEFT EYE: OS_EXAM: NORMAL

## 2020-07-13 NOTE — NURSING NOTE
Chief Complaints and History of Present Illnesses   Patient presents with     Retinal Evaluation     Chief Complaint(s) and History of Present Illness(es)     Retinal Evaluation     Laterality: both eyes    Onset: gradual    Onset: months ago    Quality: Feels that the va is blurry      Associated symptoms: photophobia (nothing new).  Negative for floaters and flashes    Pain scale: 0/10              Comments     Incontinentia pigmenti   Maribel Paulino COT 7:35 AM July 13, 2020

## 2020-07-13 NOTE — PROGRESS NOTES
CC -  Incontinentia Pigmenti with TRD    INTERVAL HISTORY - Reports vision stable since LARON in 2018.  Missed appts d/t illness and snowstorms  No insurance, paying out of pocket      HPI -   Padmini Blandon is a  29 year old year-old patient with IP and TRD OU, monocular.      PAST OCULAR SURGERY  PRP OU (remote)  PRP OS 8/2014 (DDK)  PRP OS 9/26/18 ([facility])    RETINAL IMAGING:  OCT 7-13-20  OD - no view  OS - mild traction/schisis in macula, severe schisis/CME outside of macula, similar to 2018 (hard to compare d/t single cuts)    PHOTOS 7-13-20  right eye  left eye      FA 9/3/19  OD -poor image quality  OS - (transit) macular ischemia, leakage at edge of prior PRP but no change in ischemia            ASSESSMENT & PLAN    1. Incontinentia Pigmenti   - severe, NLP OD   - s/p PRP OU remote, and OS 8/2104 & 9/2018   - no active NV visible on exam or FA 9/2019     - patient nystagmus makes imaging very difficult    2. TRD OS   - s/p PRP remote, and 8/2014 & 9/2018   - extensive schisis and areas of SRF on OCT outside of macula   - difficult to get images through macula d/t nystagmus   - unclear if worsening, no clear change from 2018   - no subjective change to vision      - given monocular and high risk for PVR observe closely for worsening   - recheck 3-4 months if able to local   - recheck 4-6 months here       3. TRD OD with NLP vision       4. Nystagmus    return to clinic: 4 months with SM, OCT and Optos Photos OU    t    ATTESTATION     Attending Attestation:     Complete documentation of historical and exam elements from today's encounter can be found in the full encounter summary report (not reduplicated in this progress note).  I personally obtained the chief complaint(s) and history of present illness.  I confirmed and edited as necessary the review of systems, past medical/surgical history, family history, social history, and examination findings as documented by others; and I examined the patient myself.   I personally reviewed the relevant tests, images, and reports as documented above.  I personally reviewed the ophthalmic test(s) associated with this encounter, agree with the interpretation(s) as documented by the resident/fellow, and have edited the corresponding report(s) as necessary.   I formulated and edited as necessary the assessment and plan and discussed the findings and management plan with the patient and family    Shell Padilla MD, PhD  , Vitreoretinal Surgery  Department of Ophthalmology  [facility]

## 2020-07-13 NOTE — PROGRESS NOTES
CC -  Incontinentia Pigmenti with TRD    INTERVAL HISTORY - Reports vision stable or a bit worse since LARON in 9/2019.  Missed last appt d/t clinic restrictions d/t Covid  Wishes to have both appts on same day with mother, transferred d/t schedule differences      YAN -   Padmini Blandon is a  29 year old year-old patient with IP and TRD OU, monocular.      PAST OCULAR SURGERY  PRP OU (remote)  PRP OS 8/2014 (DDK)  PRP OS 9/26/18 ([facility])    RETINAL IMAGING:  OCT 7/13/2020  OD - no view  OS - mild traction/schisis in macula, severe schisis/CME outside of macula, similar to 2018 & 2019 (hard to compare d/t single cuts)    FA 9/3/19  OD -poor image quality   OS - (transit) macular ischemia, leakage at edge of prior PRP but no change in ischemia        ASSESSMENT & PLAN    1. Incontinentia Pigmenti   - severe, NLP OD   - s/p PRP OU remote, and OS 8/2104 & 9/2018   - no active NV visible on exam or FA 9/2019, on clinical exam 7/13/2020     2. TRD OS   - s/p PRP remote, and 8/2014 & 9/2018   - extensive schisis and areas of SRF on OCT outside of macula   - difficult to get images through macula d/t nystagmus   - unclear if worsening, no clear change from 2018 or 2019   - no significant subjective change to vision      - given monocular and high risk for PVR observe closely for worsening   - recheck 6 months here       3. TRD OD with NLP vision     4. Nystagmus    return to clinic: 6 months with SM, OCT and FA OU    Jaya Beltran MD  Vitreoretinal Surgery Fellow  [facility]    ATTESTATION     Attending Physician Attestation:      Complete documentation of historical and exam elements from today's encounter can be found in the full encounter summary report (not reduplicated in this progress note).  I personally obtained the chief complaint(s) and history of present illness.  I confirmed and edited as necessary the review of systems, past medical/surgical history, family history, social history, and  examination findings as documented by others; and I examined the patient myself.  I personally reviewed the relevant tests, images, and reports as documented above.  I personally reviewed the ophthalmic test(s) associated with this encounter, agree with the interpretation(s) as documented by the resident/fellow, and have edited the corresponding report(s) as necessary.   I formulated and edited as necessary the assessment and plan and discussed the findings and management plan with the patient and family    Shell Padilla MD, PhD  , Vitreoretinal Surgery  Department of Ophthalmology  [facility]

## 2020-11-07 ENCOUNTER — HEALTH MAINTENANCE LETTER (OUTPATIENT)
Age: 30
End: 2020-11-07

## 2020-12-16 DIAGNOSIS — Q82.3 INCONTINENTIA PIGMENTI: Primary | ICD-10-CM

## 2021-01-04 ENCOUNTER — OFFICE VISIT (OUTPATIENT)
Dept: OPHTHALMOLOGY | Facility: CLINIC | Age: 31
End: 2021-01-04
Attending: OPHTHALMOLOGY
Payer: COMMERCIAL

## 2021-01-04 DIAGNOSIS — Q82.3 INCONTINENTIA PIGMENTI: Primary | ICD-10-CM

## 2021-01-04 PROCEDURE — 99213 OFFICE O/P EST LOW 20 MIN: CPT | Performed by: OPHTHALMOLOGY

## 2021-01-04 PROCEDURE — G0463 HOSPITAL OUTPT CLINIC VISIT: HCPCS

## 2021-01-04 PROCEDURE — 92250 FUNDUS PHOTOGRAPHY W/I&R: CPT | Performed by: OPHTHALMOLOGY

## 2021-01-04 PROCEDURE — 92134 CPTRZ OPH DX IMG PST SGM RTA: CPT | Performed by: OPHTHALMOLOGY

## 2021-01-04 PROCEDURE — 99207 FUNDUS PHOTOS OU (BOTH EYES): CPT | Performed by: OPHTHALMOLOGY

## 2021-01-04 PROCEDURE — 92235 FLUORESCEIN ANGRPH MLTIFRAME: CPT | Performed by: OPHTHALMOLOGY

## 2021-01-04 RX ORDER — NICOTINE POLACRILEX 2 MG
GUM BUCCAL
COMMUNITY

## 2021-01-04 RX ORDER — VALACYCLOVIR HYDROCHLORIDE 1 G/1
1000 TABLET, FILM COATED ORAL
COMMUNITY
Start: 2020-09-30

## 2021-01-04 RX ORDER — CLINDAMYCIN PHOSPHATE 11.9 MG/ML
SOLUTION TOPICAL
COMMUNITY
Start: 2020-08-20

## 2021-01-04 ASSESSMENT — CONF VISUAL FIELD
OS_INFERIOR_NASAL_RESTRICTION: 1
OD_SUPERIOR_TEMPORAL_RESTRICTION: 1
OS_SUPERIOR_TEMPORAL_RESTRICTION: 1
OD_INFERIOR_TEMPORAL_RESTRICTION: 1
OD_INFERIOR_NASAL_RESTRICTION: 1
OS_SUPERIOR_NASAL_RESTRICTION: 3
OD_SUPERIOR_NASAL_RESTRICTION: 1
OS_INFERIOR_TEMPORAL_RESTRICTION: 1

## 2021-01-04 ASSESSMENT — SLIT LAMP EXAM - LIDS
COMMENTS: NORMAL
COMMENTS: NORMAL

## 2021-01-04 ASSESSMENT — CUP TO DISC RATIO: OS_RATIO: SMALL

## 2021-01-04 ASSESSMENT — TONOMETRY
IOP_METHOD: TONOPEN
OS_IOP_MMHG: 17
OD_IOP_MMHG: 14

## 2021-01-04 ASSESSMENT — VISUAL ACUITY
METHOD: SNELLEN - LINEAR
OD_SC: NLP
OS_SC: 20/400

## 2021-01-04 ASSESSMENT — EXTERNAL EXAM - LEFT EYE: OS_EXAM: NORMAL

## 2021-01-04 ASSESSMENT — EXTERNAL EXAM - RIGHT EYE: OD_EXAM: NORMAL

## 2021-01-04 NOTE — PROGRESS NOTES
CC -  Incontinentia Pigmenti with TRD    INTERVAL HISTORY - VA stable subjectively  Wishes to have both appts on same day with mother, transferred d/t schedule differences      PMH -   Padmini Blandon is a  30 year old year-old patient with IP and TRD OU, monocular.      PAST OCULAR SURGERY  PRP OU (remote)  PRP OS 8/2014 (DDK)  PRP OS 9/26/18 (SM)    RETINAL IMAGING:  OCT 1-4-21  OD - no view  OS - mild traction/schisis in macula, severe schisis/CME outside of macula, similar to 2018 & 2019 (hard to compare d/t single cuts)    FA 9/3/19  OD -poor image quality   OS - (transit) macular ischemia, leakage at edge of prior PRP but no change in ischemia compared to 2019        ASSESSMENT & PLAN    #. Incontinentia Pigmenti   - severe, NLP OD   - s/p PRP OU remote, and OS 8/2104 & 9/2018   - no active NV visible on exam or FA 9/2019, on clinical exam 1/2021     - last FA 1/2021   - plan repeat FA ~ every 1 year    #. TRD OS   - s/p PRP remote, and 8/2014 & 9/2018   - extensive schisis and areas of SRF on OCT outside of macula   - difficult to get images through macula d/t nystagmus   - unclear if worsening, no clear change from 2018 or 2019 on OCT   - no significant subjective change to vision      - given monocular and high risk for PVR observe closely for worsening   - recheck 6 months here       #. TRD OD with NLP vision     #. Nystagmus    return to clinic: 6 months  OCT and DFE OU      ATTESTATION     Attending Physician Attestation:      Complete documentation of historical and exam elements from today's encounter can be found in the full encounter summary report (not reduplicated in this progress note).  I personally obtained the chief complaint(s) and history of present illness.  I confirmed and edited as necessary the review of systems, past medical/surgical history, family history, social history, and examination findings as documented by others; and I examined the patient myself.  I personally reviewed the  relevant tests, images, and reports as documented above.  I personally reviewed the ophthalmic test(s) associated with this encounter, agree with the interpretation(s) as documented by the resident/fellow, and have edited the corresponding report(s) as necessary.   I formulated and edited as necessary the assessment and plan and discussed the findings and management plan with the patient and family    Shell Padilla MD, PhD  , Vitreoretinal Surgery  Department of Ophthalmology  [facility]

## 2021-02-05 ENCOUNTER — TELEPHONE (OUTPATIENT)
Dept: OPHTHALMOLOGY | Facility: CLINIC | Age: 31
End: 2021-02-05

## 2021-06-28 DIAGNOSIS — Q82.3 INCONTINENTIA PIGMENTI: Primary | ICD-10-CM

## 2021-07-12 ENCOUNTER — OFFICE VISIT (OUTPATIENT)
Dept: OPHTHALMOLOGY | Facility: CLINIC | Age: 31
End: 2021-07-12
Attending: OPHTHALMOLOGY
Payer: MEDICARE

## 2021-07-12 DIAGNOSIS — Q82.3 INCONTINENTIA PIGMENTI: ICD-10-CM

## 2021-07-12 PROCEDURE — 99213 OFFICE O/P EST LOW 20 MIN: CPT | Mod: GC | Performed by: OPHTHALMOLOGY

## 2021-07-12 PROCEDURE — G0463 HOSPITAL OUTPT CLINIC VISIT: HCPCS

## 2021-07-12 PROCEDURE — 92134 CPTRZ OPH DX IMG PST SGM RTA: CPT | Performed by: OPHTHALMOLOGY

## 2021-07-12 ASSESSMENT — VISUAL ACUITY
OD_SC: NLP
METHOD: SNELLEN - LINEAR
OS_SC: 20/500

## 2021-07-12 ASSESSMENT — CONF VISUAL FIELD
OD_INFERIOR_TEMPORAL_RESTRICTION: 1
OD_SUPERIOR_NASAL_RESTRICTION: 1
OD_INFERIOR_NASAL_RESTRICTION: 1
OS_SUPERIOR_TEMPORAL_RESTRICTION: 3
OD_SUPERIOR_TEMPORAL_RESTRICTION: 1
OS_SUPERIOR_NASAL_RESTRICTION: 3
OS_INFERIOR_TEMPORAL_RESTRICTION: 1
OS_INFERIOR_NASAL_RESTRICTION: 1

## 2021-07-12 ASSESSMENT — TONOMETRY
IOP_METHOD: TONOPEN
OD_IOP_MMHG: 20
OS_IOP_MMHG: 16

## 2021-07-12 ASSESSMENT — SLIT LAMP EXAM - LIDS
COMMENTS: NORMAL
COMMENTS: NORMAL

## 2021-07-12 ASSESSMENT — EXTERNAL EXAM - LEFT EYE: OS_EXAM: NORMAL

## 2021-07-12 ASSESSMENT — EXTERNAL EXAM - RIGHT EYE: OD_EXAM: NORMAL

## 2021-07-12 ASSESSMENT — CUP TO DISC RATIO: OS_RATIO: SMALL

## 2021-07-12 NOTE — PROGRESS NOTES
CC -  Incontinentia Pigmenti with TRD    INTERVAL HISTORY - PT feels her vision has slightly worsened in her only seeing eye.       PMH -   Padmini Blandon is a  30 year old year-old patient with IP and TRD OU, monocular.      PAST OCULAR SURGERY  PRP OU (remote)  PRP OS 8/2014 (DDK)  PRP OS 9/26/18 (SM)    RETINAL IMAGING:  OCT 7/12/2021  OD - no view  OS - scans limited to single cuts 2/2 nystagmus. Prominent image artifact 2/2 extensive schisis. Difficult to compare with previous study. Schisis is most prominent in the nasal, and inferior macula, no srf    ASSESSMENT & PLAN    #. Incontinentia Pigmenti   - severe, NLP OD   - s/p PRP OU remote, and OS 8/2104 & 9/2018   - no active NV visible on exam 07/12/21 or most recent FA 1/2021     - plan repeat FA ~ every 1 year      #. TRD OS   - s/p PRP remote, and 8/2014 & 9/2018   - extensive schisis and areas of SRF on OCT outside of macula   - difficult to get images through macula d/t nystagmus     - appears to be worsening slowly on OCT c/t 2019 & 2020   - VA slowly worsening   - subjectively worse     - suspect worsening d/t retina not refractive   - d/w patient may worsen further or may be stable without intervention   - high risk for PVR and RD with surgery     - may consider PPV/MS possible bubble   - will see Justin for 2nd opinion         #. TRD OD with NLP vision   - stable       #. Nystagmus    return to clinic: 6 months  OCT and DFE OU, FA OU transits OD (may have f/u with Justin)      ATTESTATION     Attending Physician Attestation:      Complete documentation of historical and exam elements from today's encounter can be found in the full encounter summary report (not reduplicated in this progress note).  I personally obtained the chief complaint(s) and history of present illness.  I confirmed and edited as necessary the review of systems, past medical/surgical history, family history, social history, and examination findings as documented by others;  and I examined the patient myself.  I personally reviewed the relevant tests, images, and reports as documented above.  I personally reviewed the ophthalmic test(s) associated with this encounter, agree with the interpretation(s) as documented by the resident/fellow, and have edited the corresponding report(s) as necessary.   I formulated and edited as necessary the assessment and plan and discussed the findings and management plan with the patient and family    Shell Padilla MD, PhD  , Vitreoretinal Surgery  Department of Ophthalmology  [facility]

## 2021-07-12 NOTE — LETTER
7/12/2021       RE: Padmini Blandon  [address]  [place] [address]     Dear Dr. Anderson,    I would like to introduce you to my patient, Padmini Blandon, whom I saw at the [facility] at [facility] and would like her to be evaluated by you within the next few months for a second opinion. Please see a copy of my visit note below.    CC -  Incontinentia Pigmenti with TRD    INTERVAL HISTORY - PT feels her vision has slightly worsened in her only seeing eye.     PMH - Padmini Blandon is a  30 year old year-old patient with IP and TRD OU, monocular.    PAST OCULAR SURGERY  PRP OU (remote)  PRP OS 8/2014 (DDK)  PRP OS 9/26/18 (SM)    RETINAL IMAGING:  OCT 7/12/2021  OD - no view  OS - scans limited to single cuts 2/2 nystagmus. Prominent image artifact 2/2 extensive schisis. Difficult to compare with previous study. Schisis is most prominent in the nasal, and inferior macula, no srf    ASSESSMENT & PLAN    #. Incontinentia Pigmenti   - severe, NLP OD   - s/p PRP OU remote, and OS 8/2104 & 9/2018   - no active NV visible on exam 07/12/21 or most recent FA 1/2021     - plan repeat FA ~ every 1 year    #. TRD OS   - s/p PRP remote, and 8/2014 & 9/2018   - extensive schisis and areas of SRF on OCT outside of macula   - difficult to get images through macula d/t nystagmus     - appears to be worsening slowly on OCT c/t 2019 & 2020   - VA slowly worsening   - subjectively worse     - suspect worsening d/t retina not refractive   - d/w patient may worsen further or may be stable without intervention   - high risk for PVR and RD with surgery     - may consider PPV/MS possible bubble   - will see Justin for 2nd opinion    #. TRD OD with NLP vision   - stable    #. Nystagmus    return to clinic: 6 months  OCT and DFE OU, FA OU transits OD (may have f/u with Justin)    ATTESTATION     Attending Physician Attestation: Complete documentation of  historical and exam elements from today's encounter can be found in the full encounter summary report (not reduplicated in this progress note).  I personally obtained the chief complaint(s) and history of present illness.  I confirmed and edited as necessary the review of systems, past medical/surgical history, family history, social history, and examination findings as documented by others; and I examined the patient myself.  I personally reviewed the relevant tests, images, and reports as documented above.  I personally reviewed the ophthalmic test(s) associated with this encounter, agree with the interpretation(s) as documented by the resident/fellow, and have edited the corresponding report(s) as necessary.   I formulated and edited as necessary the assessment and plan and discussed the findings and management plan with the patient and family- Shell Padilla MD, PhD      Again, thank you for allowing me to participate in the care of your patient.      Sincerely,    Shell Padilla MD, PhD  , Vitreoretinal Surgery  Department of Ophthalmology  [facility]

## 2021-07-12 NOTE — NURSING NOTE
Chief Complaints and History of Present Illnesses   Patient presents with     Follow Up     Chief Complaint(s) and History of Present Illness(es)     Follow Up     Laterality: both eyes    Associated symptoms: dryness.  Negative for eye pain, floaters and flashes    Pain scale: 0/10              Comments     Padmini is here to continue care for Incontinentia pigmenti. She feels vision is a little more blurry since last visit.     Medardo Trevizo COT 7:55 AM July 12, 2021

## 2021-09-11 ENCOUNTER — HEALTH MAINTENANCE LETTER (OUTPATIENT)
Age: 31
End: 2021-09-11

## 2022-01-01 ENCOUNTER — HEALTH MAINTENANCE LETTER (OUTPATIENT)
Age: 32
End: 2022-01-01

## 2022-01-11 ENCOUNTER — TRANSFERRED RECORDS (OUTPATIENT)
Dept: HEALTH INFORMATION MANAGEMENT | Facility: CLINIC | Age: 32
End: 2022-01-11
Payer: COMMERCIAL

## 2022-01-27 DIAGNOSIS — Q82.3 INCONTINENTIA PIGMENTI: Primary | ICD-10-CM

## 2022-02-01 ENCOUNTER — OFFICE VISIT (OUTPATIENT)
Dept: OPHTHALMOLOGY | Facility: CLINIC | Age: 32
End: 2022-02-01
Attending: OPHTHALMOLOGY
Payer: COMMERCIAL

## 2022-02-01 DIAGNOSIS — Q82.3 INCONTINENTIA PIGMENTI: ICD-10-CM

## 2022-02-01 PROCEDURE — 92134 CPTRZ OPH DX IMG PST SGM RTA: CPT | Performed by: OPHTHALMOLOGY

## 2022-02-01 PROCEDURE — 99213 OFFICE O/P EST LOW 20 MIN: CPT | Performed by: OPHTHALMOLOGY

## 2022-02-01 PROCEDURE — G0463 HOSPITAL OUTPT CLINIC VISIT: HCPCS

## 2022-02-01 ASSESSMENT — EXTERNAL EXAM - RIGHT EYE: OD_EXAM: NORMAL

## 2022-02-01 ASSESSMENT — CONF VISUAL FIELD
OS_SUPERIOR_NASAL_RESTRICTION: 1
OS_INFERIOR_TEMPORAL_RESTRICTION: 1
OD_SUPERIOR_NASAL_RESTRICTION: 1
OD_SUPERIOR_TEMPORAL_RESTRICTION: 1
OD_INFERIOR_NASAL_RESTRICTION: 1
OD_INFERIOR_TEMPORAL_RESTRICTION: 1
OS_INFERIOR_NASAL_RESTRICTION: 1
METHOD: COUNTING FINGERS
OS_SUPERIOR_TEMPORAL_RESTRICTION: 3

## 2022-02-01 ASSESSMENT — TONOMETRY
OS_IOP_MMHG: 17
IOP_METHOD: TONOPEN
OD_IOP_MMHG: 21

## 2022-02-01 ASSESSMENT — VISUAL ACUITY
OD_SC: NLP
OS_PH_SC: 20/300
OS_SC: 20/400
METHOD: SNELLEN - LINEAR

## 2022-02-01 ASSESSMENT — EXTERNAL EXAM - LEFT EYE: OS_EXAM: NORMAL

## 2022-02-01 ASSESSMENT — SLIT LAMP EXAM - LIDS
COMMENTS: NORMAL
COMMENTS: NORMAL

## 2022-02-01 NOTE — PROGRESS NOTES
CC -  Incontinentia Pigmenti with TRD    INTERVAL HISTORY - VA stable, increased light sensitivity lately. Pregnant, expected delivery 5/2022    %%%%%%%%%%  % Addendum 5/19/22  % patient has been using PF gtts  OD since 2018 prescribed by  d/t NVG, paused d/t pregnancy in [id number]  %%%%%%%%%%    PMH -   Padmini Blandon is a  31 year old  patient with IP and TRD OU, monocular.      PAST OCULAR SURGERY  PRP OU (remote)  PRP OS 8/2014 (DDK)  PRP OS 9/26/18 ()    RETINAL IMAGING:  OCT 2-1-22  OD - no view  OS - scans limited to single cuts 2/2 nystagmus. Prominent image artifact 2/2 extensive schisis. Difficult to compare with previous study. Schisis is most prominent in the nasal, and inferior macula, no srf    ASSESSMENT & PLAN    #. Incontinentia Pigmenti   - severe, NLP OD   - s/p PRP OU remote, and OS 8/2104 & 9/2018   - no active NV visible on exam 07/12/21 or most recent FA 1/2021     - plan repeat FA ~ every 1 year - defer for now d/t pregnancy/nursing      #. TRD OS   - s/p PRP remote, and 8/2014 & 9/2018   - extensive schisis and areas of SRF on OCT outside of macula   - difficult to get images through macula d/t nystagmus     - appears to be worsening slowly on OCT c/t 2019 & 2020   - VA was worsening stable today   - ozzie Anderson 2021 advised observation       - d/w patient may worsen further or may be stable without intervention   - high risk for PVR and RD with surgery     - recheck 6 months           #. TRD OD with NLP vision   - stable       #. Nystagmus    return to clinic: 6 months  OCT and DFE OU      ATTESTATION     Attending Physician Attestation:      Complete documentation of historical and exam elements from today's encounter can be found in the full encounter summary report (not reduplicated in this progress note).  I personally obtained the chief complaint(s) and history of present illness.  I confirmed and edited as necessary the review of systems, past medical/surgical history,  family history, social history, and examination findings as documented by others; and I examined the patient myself.  I personally reviewed the relevant tests, images, and reports as documented above.  I formulated and edited as necessary the assessment and plan and discussed the findings and management plan with the patient and family    Shell Padilla MD, PhD  , Vitreoretinal Surgery  Department of Ophthalmology  [facility]

## 2022-02-01 NOTE — NURSING NOTE
Chief Complaints and History of Present Illnesses   Patient presents with     Follow Up     Incontinentia Pigmenti     Chief Complaint(s) and History of Present Illness(es)     Follow Up     Laterality: both eyes    Course: stable    Associated symptoms: photophobia.  Negative for floaters, flashes and eye pain    Treatments tried: no treatments    Pain scale: 0/10    Comments: Incontinentia Pigmenti              Comments     Pt pregnant 27 weeks, C/o some blurry vision the last few weeks    Ariana Rowe COT 9:03 AM February 1, 2022

## 2022-06-02 ENCOUNTER — TELEPHONE (OUTPATIENT)
Dept: OPHTHALMOLOGY | Facility: CLINIC | Age: 32
End: 2022-06-02
Payer: MEDICARE

## 2022-06-02 DIAGNOSIS — Q82.3 INCONTINENTIA PIGMENTI: Primary | ICD-10-CM

## 2022-06-02 NOTE — TELEPHONE ENCOUNTER
H/o no light perception in one eye and low vision in opposite eye    Mother states pt with complaint of worsening vision in better seeing eye and more pain in opposite eye.    Mother states been more gradual    Offered appt tomorrow and pt/daughter hoping for appt next week-- pt travels from [place]    Scheduled Tuesday with Dr. Padilla    Mother aware of date/time/location at [facility]    Pt/mother to contact clinic for any sudden vision loss/worsening symptoms    Mother typically has appt's same day with Dr. Padilla as daughter and scheduled mother on Tuesday also    Sergio Colon RN 8:48 AM 06/02/22          [facility] Call Center    Phone Message    May a detailed message be left on voicemail: yes     Reason for Call: Symptoms or Concerns     If patient has red-flag symptoms, warm transfer to triage line    Current symptom or concern: Per pt's mom, pt has been experiencing more eye pain and vision loss. Pt is currently scheduled on 7/25 with Dr. Padilla.    Symptoms have been present for:  1 month(s)    Has patient previously been seen for this? Yes    By : Dr. Padilla    Date: 2/1/22    Are there any new or worsening symptoms? Yes: Increased eye pain and vision loss over the last month.      Action Taken: Message routed to:  [facility]    Travel Screening: Not Applicable

## 2022-06-07 ENCOUNTER — OFFICE VISIT (OUTPATIENT)
Dept: OPHTHALMOLOGY | Facility: CLINIC | Age: 32
End: 2022-06-07
Attending: OPHTHALMOLOGY
Payer: COMMERCIAL

## 2022-06-07 DIAGNOSIS — Q82.3 INCONTINENTIA PIGMENTI: ICD-10-CM

## 2022-06-07 PROCEDURE — G0463 HOSPITAL OUTPT CLINIC VISIT: HCPCS | Mod: 25

## 2022-06-07 PROCEDURE — 99214 OFFICE O/P EST MOD 30 MIN: CPT | Performed by: OPHTHALMOLOGY

## 2022-06-07 PROCEDURE — 92134 CPTRZ OPH DX IMG PST SGM RTA: CPT | Performed by: OPHTHALMOLOGY

## 2022-06-07 RX ORDER — PREDNISOLONE ACETATE 10 MG/ML
1-2 SUSPENSION/ DROPS OPHTHALMIC 3 TIMES DAILY PRN
Qty: 5 ML | Refills: 11 | Status: SHIPPED | OUTPATIENT
Start: 2022-06-07

## 2022-06-07 ASSESSMENT — CONF VISUAL FIELD
OS_SUPERIOR_TEMPORAL_RESTRICTION: 1
OD_SUPERIOR_NASAL_RESTRICTION: 1
OD_INFERIOR_TEMPORAL_RESTRICTION: 1
OS_INFERIOR_NASAL_RESTRICTION: 1
OD_SUPERIOR_TEMPORAL_RESTRICTION: 1
OS_SUPERIOR_NASAL_RESTRICTION: 1
OD_INFERIOR_NASAL_RESTRICTION: 1
OS_INFERIOR_TEMPORAL_RESTRICTION: 1

## 2022-06-07 ASSESSMENT — VISUAL ACUITY
CORRECTION_TYPE: GLASSES
OD_CC: NLP
OS_CC: 20/500
METHOD: SNELLEN - LINEAR

## 2022-06-07 ASSESSMENT — TONOMETRY
OS_IOP_MMHG: 17
OD_IOP_MMHG: 22
IOP_METHOD: TONOPEN

## 2022-06-07 ASSESSMENT — SLIT LAMP EXAM - LIDS
COMMENTS: NORMAL
COMMENTS: NORMAL

## 2022-06-07 ASSESSMENT — EXTERNAL EXAM - RIGHT EYE: OD_EXAM: NORMAL

## 2022-06-07 ASSESSMENT — REFRACTION_WEARINGRX
OD_SPHERE: BALANCE
OS_SPHERE: -4.00

## 2022-06-07 ASSESSMENT — EXTERNAL EXAM - LEFT EYE: OS_EXAM: NORMAL

## 2022-06-07 NOTE — PROGRESS NOTES
CC -  Incontinentia Pigmenti with TRD    INTERVAL HISTORY - VA worsening, delivered baby 4/2022, mild eye ache uncertain which, had quit prednisolone OD while pregnant, was using few times per month      PMH -   Padmini Blandon is a  31 year old  patient with IP and TRD OU, monocular.   patient had been using PF gtts  OD since 2018 prescribed by  d/t NVG, paused d/t pregnancy in [id number]    PAST OCULAR SURGERY  PRP OU (remote)  PRP OS 8/2014 (DDK)  PRP OS 9/26/18 ()    RETINAL IMAGING:  OCT 6/7/22  OD - poor quality image  OS - scans limited to single cuts 2/2 nystagmus. Prominent image artifact 2/2 extensive schisis. Difficult to compare with previous study. Schisis peripher & RD macula new    ASSESSMENT & PLAN    #. Incontinentia Pigmenti   - severe, NLP OD   - s/p PRP OU remote, and OS 8/2104 & 9/2018   - no active NV visible on exam 07/12/21 or most recent FA 1/2021     - plan repeat FA ~ every 1 year - defer for now d/t pregnancy/nursing      #. TRD OS   - s/p PRP remote, and 8/2014 & 9/2018   - prior extensive schisis and areas of SRF on OCT outside of macula   - difficult to get images through macula d/t nystagmus   - appeared to be worsening slowly 2021 on OCT c/t 2019 & 2020   - saw MISAEL Anderson 8/2021     - today now has mac-off RD OS   - likely TRD/RRD   - return to [place] for eval and treat           #. TRD OD with NLP vision   - stable   - given prior sporadic use of PF suspect artificial tears adequate   - will prescribe in case needs it         #. Nystagmus    return to clinic: PRN at [facility] after visit with GE      ATTESTATION     Attending Physician Attestation:      Complete documentation of historical and exam elements from today's encounter can be found in the full encounter summary report (not reduplicated in this progress note).  I personally obtained the chief complaint(s) and history of present illness.  I confirmed and edited as necessary the review of systems, past medical/surgical  history, family history, social history, and examination findings as documented by others; and I examined the patient myself.  I personally reviewed the relevant tests, images, and reports as documented above.  I formulated and edited as necessary the assessment and plan and discussed the findings and management plan with the patient and family    Shell Padilla MD, PhD  , Vitreoretinal Surgery  Department of Ophthalmology  [facility]

## 2022-06-08 ENCOUNTER — TRANSFERRED RECORDS (OUTPATIENT)
Dept: HEALTH INFORMATION MANAGEMENT | Facility: CLINIC | Age: 32
End: 2022-06-08
Payer: MEDICARE

## 2022-10-29 ENCOUNTER — HEALTH MAINTENANCE LETTER (OUTPATIENT)
Age: 32
End: 2022-10-29

## 2022-12-11 ENCOUNTER — HEALTH MAINTENANCE LETTER (OUTPATIENT)
Age: 32
End: 2022-12-11

## 2023-04-02 ENCOUNTER — HEALTH MAINTENANCE LETTER (OUTPATIENT)
Age: 33
End: 2023-04-02

## 2023-08-21 ENCOUNTER — TRANSFERRED RECORDS (OUTPATIENT)
Dept: HEALTH INFORMATION MANAGEMENT | Facility: CLINIC | Age: 33
End: 2023-08-21
Payer: MEDICARE

## 2023-10-19 DIAGNOSIS — Q82.3 INCONTINENTIA PIGMENTI: Primary | ICD-10-CM

## 2023-10-19 DIAGNOSIS — H43.393 VITREOUS SYNERESIS OF BOTH EYES: ICD-10-CM

## 2023-10-30 ENCOUNTER — OFFICE VISIT (OUTPATIENT)
Dept: OPHTHALMOLOGY | Facility: CLINIC | Age: 33
End: 2023-10-30
Attending: OPHTHALMOLOGY
Payer: MEDICARE

## 2023-10-30 DIAGNOSIS — H43.393 VITREOUS SYNERESIS OF BOTH EYES: Primary | ICD-10-CM

## 2023-10-30 PROCEDURE — 92134 CPTRZ OPH DX IMG PST SGM RTA: CPT | Performed by: OPHTHALMOLOGY

## 2023-10-30 PROCEDURE — 99207 FUNDUS PHOTOS OS (LEFT EYE): CPT | Mod: 26 | Performed by: OPHTHALMOLOGY

## 2023-10-30 PROCEDURE — G0463 HOSPITAL OUTPT CLINIC VISIT: HCPCS | Performed by: OPHTHALMOLOGY

## 2023-10-30 PROCEDURE — 99214 OFFICE O/P EST MOD 30 MIN: CPT | Mod: GC | Performed by: OPHTHALMOLOGY

## 2023-10-30 PROCEDURE — 92250 FUNDUS PHOTOGRAPHY W/I&R: CPT | Performed by: OPHTHALMOLOGY

## 2023-10-30 ASSESSMENT — EXTERNAL EXAM - RIGHT EYE: OD_EXAM: NORMAL

## 2023-10-30 ASSESSMENT — SLIT LAMP EXAM - LIDS
COMMENTS: NORMAL
COMMENTS: NORMAL

## 2023-10-30 ASSESSMENT — VISUAL ACUITY
OD_SC: NLP
OS_SC: LP
METHOD: SNELLEN - LINEAR

## 2023-10-30 ASSESSMENT — EXTERNAL EXAM - LEFT EYE: OS_EXAM: NORMAL

## 2023-10-30 ASSESSMENT — TONOMETRY
OS_IOP_MMHG: 10
OD_IOP_MMHG: 11
IOP_METHOD: TONOPEN

## 2023-10-30 ASSESSMENT — CONF VISUAL FIELD
OS_INFERIOR_TEMPORAL_RESTRICTION: 1
OD_INFERIOR_NASAL_RESTRICTION: 1
OS_SUPERIOR_TEMPORAL_RESTRICTION: 1
OS_INFERIOR_NASAL_RESTRICTION: 1
OD_SUPERIOR_NASAL_RESTRICTION: 1
OD_INFERIOR_TEMPORAL_RESTRICTION: 1
OD_SUPERIOR_TEMPORAL_RESTRICTION: 1
OS_SUPERIOR_NASAL_RESTRICTION: 1

## 2023-10-30 NOTE — LETTER
10/30/2023       RE: Padmini Blandon  [address]  [place] [address]     Dear Colleague,    Thank you for referring your patient, Padmini Blandon, to the [facility] [place] at [facility]. Please see a copy of my visit note below.    CC -  Incontinentia Pigmenti with TRD    INTERVAL HISTORY - Patient was last seen on 06/2022, she was referred to Dr. MISAEL Anderson, since then she has had 3 surgeries, 06/28/2022 (scleral buckle), 11/11/2022 (vitrectomy), 07/21/2023 (vitrectomy), following her 3rd surgery her vision has decreased, per patient she thinks is scar tissue pulling on her retina. Referred for opinion on benefit from re-op or not  Outside records reviewed      PMH -   Padmini Blandon is a  32 year old  patient with IP and TRD OU, monocular.   patient had been using PF gtts  OD since 2018 prescribed by  d/t NVG, paused d/t pregnancy in [id number]  Delivered baby on 04/2022    PAST OCULAR SURGERY  PRP OU (remote)  PRP OS 8/2014 (DDK)  PRP OS 9/26/18 ()  SBP/cryo 6/28/22 ([facility])  PPV/MS/SO 11/11/22 ([facility])  PPV/SOR+SO/MS/retinectomy left eye 7/21/23 ([facility])    RETINAL IMAGING:  OCT 10/30/2023  OD - poor quality image  OS - scans limited to single cuts 2/2 nystagmus. very noisy/limited, Retina appears detached. mild ERM visible    Optos photos  Left eye: Hazy view due to cataract. Optic nerve is flat. Difficult to assess retina. Previous retinectomy noticed. PVR with TRD??    ASSESSMENT & PLAN    #. Incontinentia Pigmenti   - severe, NLP OD   - s/p PRP OU remote, and OS 8/2104 & 9/2018    #. Chronic RD left eye with PVR   - s/p PRP remote, and 8/2014 & 9/2018   - prior extensive schisis and areas of SRF on OCT outside of macula   - worsened progressively on OCT 2019->2021   - s/p surgery x 3, last 7/2023 with posterior retinectomy      - recurrent RD x 2, per notes 1 month after surgery in both cases   - currently detached despite posterior retinectomy 360      - could  attempt further surgery but suspect high risk of recurrence   - would need PPV/PPL, could try peeling ILM if not already done   - d/w patient low likelihood of success   - given detachment despite posterior retinectomy suspect severe retinal foreshortening   - unlikely to flatten with further retinectomy, retinectomy edges currently very posterior   - patient plans to observe      - will f/u 3-4 months with [facility]      #. TRD OD with NLP vision   - stable   - given prior sporadic use of PF suspect artificial tears adequate   - will prescribe in case needs it     #. Nystagmus   - Stable, secondary to low vision    # Nuclear sclerosing cataract left eye   - Likely visually significant  - Guarded prognosis given RD    return to clinic: PRN at [facility] after visit with WERNER Monsalve MD  PGY-5 Vitreo-retina surgery Fellow  Department of Ophthalmology   [facility]      ATTESTATION   Attending Physician Attestation:   Complete documentation of historical and exam elements from today's encounter can be found in the full encounter summary report (not reduplicated in this progress note).  I personally obtained the chief complaint(s) and history of present illness.  I confirmed and edited as necessary the review of systems, past medical/surgical history, family history, social history, and examination findings as documented by others; and I examined the patient myself.  I personally reviewed the relevant tests, images, and reports as documented above.  I personally reviewed the ophthalmic test(s) associated with this encounter, agree with the interpretation(s) as documented by the resident/fellow, and have edited the corresponding report(s) as necessary.   I formulated and edited as necessary the assessment and plan and discussed the findings and management plan with the patient and family. Shell Padilla MD, PhD      Base Eye Exam       Visual Acuity (Snellen - Linear)         Right Left    Dist sc NLP LP               Tonometry (Tonopen, 8:08 AM)         Right Left    Pressure 11 10              Pupils         APD    Right Yes    Left None              Visual Fields         Left Right    Restrictions Total superior temporal, inferior temporal, superior nasal, inferior nasal deficiencies Total superior temporal, inferior temporal, superior nasal, inferior nasal deficiencies              Extraocular Movement         Right Left     Nystagmus Nystagmus     -- -- --   --  --   -- -- --    -- -- --   --  --   -- -- --                 Neuro/Psych       Oriented x3: Yes    Mood/Affect: Normal              Dilation       Both eyes: 1.0% Mydriacyl @ 8:08 AM                  Slit Lamp and Fundus Exam       External Exam         Right Left    External Normal Normal              Slit Lamp Exam         Right Left    Lids/Lashes Normal Normal    Conjunctiva/Sclera White and quiet White and quiet    Cornea Clear 2+ Punctate epithelial erosions    Anterior Chamber Deep and quiet Deep and quiet    Iris Irregular pupil, small, synechiae dilated    Lens diffuse dense cataract 3+ diffuse NS/kwasi    Vitreous  SO              Fundus Exam         Right Left    Disc  hazy view, pallor    Macula  hazy view, very posterior retinectomy 360    Vessels  attenuated    Periphery  hazy view, periphery atrophy/pigment                    Again, thank you for allowing me to participate in the care of your patient.      Sincerely,    Shell Padilla MD, PhD  , Vitreoretinal Surgery  Department of Ophthalmology & Visual Neurosciences  [facility]

## 2023-10-30 NOTE — NURSING NOTE
"Chief Complaints and History of Present Illnesses   Patient presents with    Follow Up       Incontinentia pigmenti         Chief Complaint(s) and History of Present Illness(es)       Follow Up              Comments:   Incontinentia pigmenti                  Comments    Pt has had \"several surgeries in the left eye \"since last visit, ( Dr Anderson)  Pt states decreased vision since she was here last   No flashes, floaters or eye pain   Wants second opinion \" can anything be done to improve my vision\"    Ariana Rowe COT 8:03 AM October 30, 2023                        "

## 2023-10-30 NOTE — PROGRESS NOTES
CC -  Incontinentia Pigmenti with TRD    INTERVAL HISTORY - Patient was last seen on 06/2022, she was referred to Dr. MISAEL Anderson, since then she has had 3 surgeries, 06/28/2022 (scleral buckle), 11/11/2022 (vitrectomy), 07/21/2023 (vitrectomy), following her 3rd surgery her vision has decreased, per patient she thinks is scar tissue pulling on her retina. Referred for opinion on benefit from re-op or not  Outside records reviewed      [facility] -   Padmini Blandon is a  32 year old  patient with IP and TRD OU, monocular.   patient had been using PF gtts  OD since 2018 prescribed by  d/t NVG, paused d/t pregnancy in 2399-2298  Delivered baby on 04/2022    PAST OCULAR SURGERY  PRP OU (remote)  PRP OS 8/2014 ([facility])  PRP OS 9/26/18 ()  SBP/cryo 6/28/22 ([facility])  PPV/MS/SO 11/11/22 ([facility])  PPV/SOR+SO/MS/retinectomy left eye 7/21/23 ([facility])    RETINAL IMAGING:  OCT 10/30/2023  OD - poor quality image  OS - scans limited to single cuts 2/2 nystagmus. very noisy/limited, Retina appears detached. mild ERM visible    Optos photos  Left eye: Hazy view due to cataract. Optic nerve is flat. Difficult to assess retina. Previous retinectomy noticed. PVR with TRD??      ASSESSMENT & PLAN    #. Incontinentia Pigmenti   - severe, NLP OD   - s/p PRP OU remote, and OS 8/2104 & 9/2018         #. Chronic RD left eye with PVR   - s/p PRP remote, and 8/2014 & 9/2018   - prior extensive schisis and areas of SRF on OCT outside of macula   - worsened progressively on OCT 2019->2021   - s/p surgery x 3, last 7/2023 with posterior retinectomy      - recurrent RD x 2, per notes 1 month after surgery in both cases   - currently detached despite posterior retinectomy 360      - could attempt further surgery but suspect high risk of recurrence   - would need PPV/PPL, could try peeling ILM if not already done   - d/w patient low likelihood of success   - given detachment despite posterior retinectomy suspect severe retinal foreshortening   - unlikely to  flatten with further retinectomy, retinectomy edges currently very posterior   - patient plans to observe      - will f/u 3-4 months with [facility]      #. TRD OD with NLP vision   - stable   - given prior sporadic use of PF suspect artificial tears adequate   - will prescribe in case needs it     #. Nystagmus   - Stable, secondary to low vision    # Nuclear sclerosing cataract left eye   - Likely visually significant  - Guarded prognosis given RD    return to clinic: PRN at [facility] after visit with WERNER Monsalve MD  PGY-5 Vitreo-retina surgery Fellow  Department of Ophthalmology   [facility]      ATTESTATION     Attending Physician Attestation:      Complete documentation of historical and exam elements from today's encounter can be found in the full encounter summary report (not reduplicated in this progress note).  I personally obtained the chief complaint(s) and history of present illness.  I confirmed and edited as necessary the review of systems, past medical/surgical history, family history, social history, and examination findings as documented by others; and I examined the patient myself.  I personally reviewed the relevant tests, images, and reports as documented above.  I personally reviewed the ophthalmic test(s) associated with this encounter, agree with the interpretation(s) as documented by the resident/fellow, and have edited the corresponding report(s) as necessary.   I formulated and edited as necessary the assessment and plan and discussed the findings and management plan with the patient and family    Shell Padilla MD, PhD  , Vitreoretinal Surgery  Department of Ophthalmology  [facility]

## 2024-06-08 ENCOUNTER — HEALTH MAINTENANCE LETTER (OUTPATIENT)
Age: 34
End: 2024-06-08

## 2025-06-15 ENCOUNTER — HEALTH MAINTENANCE LETTER (OUTPATIENT)
Age: 35
End: 2025-06-15